# Patient Record
Sex: FEMALE | Race: BLACK OR AFRICAN AMERICAN
[De-identification: names, ages, dates, MRNs, and addresses within clinical notes are randomized per-mention and may not be internally consistent; named-entity substitution may affect disease eponyms.]

---

## 2017-08-13 ENCOUNTER — HOSPITAL ENCOUNTER (EMERGENCY)
Dept: HOSPITAL 74 - FER | Age: 27
Discharge: HOME | End: 2017-08-13
Payer: SELF-PAY

## 2017-08-13 VITALS — TEMPERATURE: 98.2 F | DIASTOLIC BLOOD PRESSURE: 83 MMHG | HEART RATE: 81 BPM | SYSTOLIC BLOOD PRESSURE: 126 MMHG

## 2017-08-13 VITALS — BODY MASS INDEX: 30.1 KG/M2

## 2017-08-13 DIAGNOSIS — N92.6: Primary | ICD-10-CM

## 2017-08-13 LAB
ALBUMIN SERPL-MCNC: 3.6 G/DL (ref 3.4–5)
ALP SERPL-CCNC: 52 U/L (ref 45–117)
ALT SERPL-CCNC: 21 U/L (ref 12–78)
ANION GAP SERPL CALC-SCNC: 10 MMOL/L (ref 8–16)
APPEARANCE UR: CLEAR
AST SERPL-CCNC: 13 U/L (ref 15–37)
BACTERIA #/AREA URNS HPF: (no result) /HPF
BASOPHILS # BLD: 1.1 % (ref 0–2)
BILIRUB SERPL-MCNC: 0.2 MG/DL (ref 0.2–1)
BILIRUB UR STRIP.AUTO-MCNC: NEGATIVE MG/DL
CALCIUM SERPL-MCNC: 8.4 MG/DL (ref 8.5–10.1)
CO2 SERPL-SCNC: 29 MMOL/L (ref 21–32)
COLOR UR: (no result)
CREAT SERPL-MCNC: 0.8 MG/DL (ref 0.55–1.02)
DEPRECATED RDW RBC AUTO: 13 % (ref 11.6–15.6)
EOSINOPHIL # BLD: 5.7 % (ref 0–4.5)
GLUCOSE SERPL-MCNC: 100 MG/DL (ref 74–106)
HYALINE CASTS URNS QL MICRO: 1 /LPF
KETONES UR QL STRIP: NEGATIVE
LEUKOCYTE ESTERASE UR QL STRIP.AUTO: NEGATIVE
MCH RBC QN AUTO: 31.6 PG (ref 25.7–33.7)
MCHC RBC AUTO-ENTMCNC: 33.7 G/DL (ref 32–36)
MCV RBC: 93.7 FL (ref 80–96)
MUCOUS THREADS URNS QL MICRO: (no result)
NEUTROPHILS # BLD: 53.7 % (ref 42.8–82.8)
NITRITE UR QL STRIP: NEGATIVE
PH UR: 6 [PH] (ref 5–8)
PLATELET # BLD AUTO: 358 K/MM3 (ref 134–434)
PMV BLD: 8.9 FL (ref 7.5–11.1)
PROT SERPL-MCNC: 7.1 G/DL (ref 6.4–8.2)
PROT UR QL STRIP: (no result)
PROT UR QL STRIP: NEGATIVE
RBC # BLD AUTO: 3 /HPF (ref 0–3)
RBC # UR STRIP: (no result) /UL
SP GR UR: >= 1.03 (ref 1–1.02)
UROBILINOGEN UR STRIP-MCNC: 0.2 MG/DL (ref 0.2–1)
WBC # BLD AUTO: 9.1 K/MM3 (ref 4–10)
WBC # UR AUTO: 17 /HPF (ref 3–5)

## 2017-08-13 NOTE — PDOC
History of Present Illness





- General


Chief Complaint: Vaginal Bleeding


Stated Complaint: ABD PAIN/BLEEDING IN PREGNANCY


Time Seen by Provider: 17 00:42


History Source: Patient


Exam Limitations: No Limitations





- History of Present Illness


Initial Comments: 


17 00:50


This is a 27-year-old female who comes in complaining of no menses since 

February of this year. Patient said she took a pregnancy test in May of this 

year and it was positive. Patient has not followed up with a OB doctor. Patient 

now comes in complaining of vaginal spotting and bleeding with associated 

abdominal cramping.





PAST MEDICAL HISTORY:  no significant history





PAST SURGICAL HISTORY:  no significant history





FAMILY HISTORY:  no pertinant history





SOCIAL HISTORY:  Pt lives with  family and is employed.





MEDICATIONS:  reviewed





ALLERGIES:  As per nursing notes





Review of Systems


General:  No fevers or chills, no weakness, no weight loss 


HEENT: No change in vision.  No sore throat,. No ear pain


CardioVascular:  No chest pain or shortness of breath


Respiratory:No cough, or wheezing. 


Gastrointestinal:  no nausea, vomitting, diarrhea or constipation,  No rectal 

bleeding


Genitourinary:  No dysuria, hematuria, or frequency


Musculoskeletal:  No joint or muscle pain or swelling


Neurologic: No headache, vertigo, dizziness or loss of consciousness


Psychiatric: nor depression 


Skin: No rashes or easy bruising


Endocrine: no increased thirst or abnormal weight change


Allergic: no skin or latex allergy


All other systems reviewed and normal








Exam:





General: Well-nourished well-developed individual, no acute distress


HEENT: Throat: Normal, tonsils normal, no erythema or exudate


               Neck: Supple, no meningeal signs, no lymphadenopathy


Eyes::Pupils equal reactive and round, extraocular motion intact


Chest: Nontender to palpation 


Abdomen: Soft, nondistended, normal bowel sounds, nontender to palpation 

diffusely


Pelvic: There is moderate amount of blood in the vaginal vault, the os is 

closed. There is no adnexal masses palpable. The patient is obese so it is 

difficult to palpate the fundus height however it does appear to be at 

approximately the pelvic brim.


Extremities: Warm, dry, no cyanosis, clubbing, or edema


Skin: No rashes


Neuro: Alert and oriented x3, nonfocal exam, grossly intact, normal gait


Psych: Normal mood and affect











17 02:24


Assessment and plan: Patient's pregnancy test was negative her ultrasound was 

negative for  pregnancy. Patient's vaginal bleeding is secondary to a menstrual 

period. Discussed with patient the importance of following up with her  OB as 

to why she is having the irregularity of her menstrual period. Patient 

discharged home.





Past History





- Past Medical History


Allergies/Adverse Reactions: 


 Allergies











Allergy/AdvReac Type Severity Reaction Status Date / Time


 


No Known Allergies Allergy   Unverified 17 00:21











Home Medications: 


Ambulatory Orders





NK [No Known Home Medication]  17 








Other medical history: DENIES





- Reproductive History


Is Patient Pregnant Now?: Yes


 (#): 1


Para: 0


Therapeutic (s) & number: Yes (1)


Spontaneous : 0





- Psycho/Social/Smoking Cessation Hx


Anxiety: No


Suicidal Ideation: No


Smoking History: Never smoked





*Physical Exam





- Vital Signs


 Last Vital Signs











Temp Pulse Resp BP Pulse Ox


 


 98.2 F   81   18   126/83   99 


 


 17 00:22  17 00:22  17 00:22  17 00:22  17 00:22














ED Treatment Course





- LABORATORY


CBC & Chemistry Diagram: 


 17 00:58





 17 00:58





*DC/Admit/Observation/Transfer


Diagnosis at time of Disposition: 


 Abnormal menstrual cycle





- Discharge Dispostion


Disposition: HOME


Condition at time of disposition: Stable


Admit: No





- Patient Instructions


Additional Instructions: 


Follow-up with your OB doctor regarding your abnormal menstrual cycles





Your pregnancy test was negative for your ultrasound did not show a pregnancy 

you are having a menstrual period and are not pregnant.





Return to the emergency department immediately with ANY new, persistent or 

worsening symptoms.





Continue any medications as previously prescribed by your physician.





You should follow up with your primary doctor as soon as possible regarding 

today's emergency department visit.


.


Please make sure your doctor reviews the results of your emergency evaluation.





Thank you for coming to the   Emergency Department today for your care. It was 

a pleasure to see you today. Please note that your evaluation is INCOMPLETE 

until you  follow-up with your doctor.

## 2018-01-10 ENCOUNTER — HOSPITAL ENCOUNTER (EMERGENCY)
Dept: HOSPITAL 74 - FER | Age: 28
Discharge: HOME | End: 2018-01-10
Payer: SELF-PAY

## 2018-01-10 VITALS — BODY MASS INDEX: 30.1 KG/M2

## 2018-01-10 VITALS — HEART RATE: 90 BPM | DIASTOLIC BLOOD PRESSURE: 87 MMHG | SYSTOLIC BLOOD PRESSURE: 129 MMHG | TEMPERATURE: 98.1 F

## 2018-01-10 DIAGNOSIS — J11.1: Primary | ICD-10-CM

## 2018-01-10 NOTE — PDOC
History of Present Illness





- General


Chief Complaint: Pain, Acute


Stated Complaint: HEAD/CHEST PAIN


Time Seen by Provider: 01/10/18 22:26


History Source: Patient


Exam Limitations: No Limitations





- History of Present Illness


Initial Comments: 





01/10/18 22:26


 This is a 27-year-old female who comes in complaining of one day of cough, 

congestion, headache, body aches, fever, chills and flulike symptoms.


Patient took some Motrin shortly before coming in but said she had a fever 

prior to taking the Motrin. The patient is now complaining that she feels 

sweaty and hot. Patient was afebrile here in the emergency room. Patient denies 

any nausea or vomiting. Patient denies any neck pain  patient is otherwise 

healthy


Patient did not get her flu shot this year.





PAST MEDICAL HISTORY:  no significant history





PAST SURGICAL HISTORY:  no significant history





FAMILY HISTORY:  no pertinant history





SOCIAL HISTORY:  Pt lives with  family and is employed.





MEDICATIONS:  reviewed





ALLERGIES:  As per nursing notes





Review of Systems


General:  + fevers + chills, no weakness, no weight loss 


HEENT: No change in vision.  No sore throat,. No ear pain


CardioVascular:  + chest pain or shortness of breath


Respiratory:+ cough, or wheezing. 


Gastrointestinal:  no nausea, vomitting, diarrhea or constipation,  No rectal 

bleeding


Genitourinary:  No dysuria, hematuria, or frequency


Musculoskeletal:  + joint + muscle pain No swelling


Neurologic: + headache, no vertigo, dizziness or loss of consciousness


Psychiatric: nor depression 


Skin: No rashes or easy bruising


Endocrine: no increased thirst or abnormal weight change


Allergic: no skin or latex allergy


All other systems reviewed and normal








Exam:





General: Well-nourished well-developed individual, no acute distress


HEENT: Throat: Normal, tonsils normal, no erythema or exudate


               Neck: Supple, no meningeal signs, no lymphadenopathy


Eyes::Pupils equal reactive and round, extraocular motion intact


Chest: Nontender to palpation 


Cardiac: S1-S2 normal, regular rate and rhythm, no murmurs rubs or gallops


Respiratory: Lungs clear to auscultation bilateral


Abdomen: Soft, nondistended, normal bowel sounds, nontender to palpation 

diffusely


Extremities: Warm, dry, no cyanosis, clubbing, or edema


Skin: No rashes


Neuro: Alert and oriented x3, CN II - XII intact, nonfocal exam with normal 

strength, normal sensation, normal reflexes, normal gait, 


Psych: Normal mood and affect





Assessment and plan: This is a 27-year-old female who comes in complaining of 

flulike illness and symptoms times once today. Patient started on Tamiflu for 

the flu and given Tylenol. Patient told to alternate acetaminophen with 

ibuprofen every 3 hours for her symptoms and follow-up with her doctor














Past History





- Past Medical History


Allergies/Adverse Reactions: 


 Allergies











Allergy/AdvReac Type Severity Reaction Status Date / Time


 


No Known Allergies Allergy   Unverified 17 00:21











Home Medications: 


Ambulatory Orders





Oseltamivir Phosphate [Tamiflu] 75 mg PO BID #14 capsule 01/10/18 








COPD: No





- Reproductive History


 (#): 1


Para: 0


Therapeutic (s) & number: Yes (1)


Spontaneous : 0





- Suicide/Smoking/Psychosocial Hx


Smoking History: Never smoked





*Physical Exam





- Vital Signs


 Last Vital Signs











Temp Pulse Resp BP Pulse Ox


 


 98.1 F   90   16   129/87   99 


 


 01/10/18 22:18  01/10/18 22:18  01/10/18 22:18  01/10/18 22:18  01/10/18 22:18














*DC/Admit/Observation/Transfer


Diagnosis at time of Disposition: 


 Influenza-like illness








- Discharge Dispostion


Disposition: HOME


Condition at time of disposition: Stable


Admit: No





- Referrals





- Patient Instructions


Printed Discharge Instructions:  Influenza, Influenza (Alternative Therapy)


Additional Instructions: 


For your symptoms U can alternate Tylenol with Motrin every 3 hours if needed.





Take Tamiflu one tablet twice a day for 7 days.





Return to the emergency department immediately with ANY new, persistent or 

worsening symptoms.





Continue any medications as previously prescribed by your physician.





You should follow up with your primary doctor as soon as possible regarding 

today's emergency department visit.


.


Please make sure your doctor reviews the results of your emergency evaluation.





Thank you for coming to the   Emergency Department today for your care. It was 

a pleasure to see you today. Please note that your evaluation is INCOMPLETE 

until you  follow-up with your doctor. 











- Post Discharge Activity

## 2021-04-06 ENCOUNTER — HOSPITAL ENCOUNTER (EMERGENCY)
Dept: HOSPITAL 74 - FER | Age: 31
Discharge: HOME | End: 2021-04-06
Payer: COMMERCIAL

## 2021-04-06 VITALS — DIASTOLIC BLOOD PRESSURE: 92 MMHG | TEMPERATURE: 98.5 F | SYSTOLIC BLOOD PRESSURE: 130 MMHG | HEART RATE: 96 BPM

## 2021-04-06 VITALS — BODY MASS INDEX: 36.9 KG/M2

## 2021-04-06 DIAGNOSIS — Z20.2: Primary | ICD-10-CM

## 2021-04-06 LAB
HCG UR QL: NEGATIVE
HIV 1+2 AB+HIV1 P24 AG SERPL QL IA: NEGATIVE